# Patient Record
Sex: MALE | Employment: FULL TIME | URBAN - METROPOLITAN AREA
[De-identification: names, ages, dates, MRNs, and addresses within clinical notes are randomized per-mention and may not be internally consistent; named-entity substitution may affect disease eponyms.]

---

## 2022-05-27 DIAGNOSIS — M25.562 LEFT KNEE PAIN, UNSPECIFIED CHRONICITY: Primary | ICD-10-CM

## 2022-07-12 ENCOUNTER — OFFICE VISIT (OUTPATIENT)
Dept: ORTHOPEDIC SURGERY | Age: 52
End: 2022-07-12

## 2022-07-12 VITALS — BODY MASS INDEX: 24.13 KG/M2 | HEIGHT: 74 IN | WEIGHT: 188 LBS

## 2022-07-12 DIAGNOSIS — S83.92XA SPRAIN OF LEFT KNEE, UNSPECIFIED LIGAMENT, INITIAL ENCOUNTER: Primary | ICD-10-CM

## 2022-07-12 DIAGNOSIS — M17.0 PRIMARY OSTEOARTHRITIS OF BOTH KNEES: ICD-10-CM

## 2022-07-12 RX ORDER — GABAPENTIN 100 MG/1
100 CAPSULE ORAL 3 TIMES DAILY
Qty: 90 CAPSULE | Refills: 0 | Status: SHIPPED | OUTPATIENT
Start: 2022-07-12 | End: 2022-08-11

## 2022-07-12 RX ORDER — DICLOFENAC SODIUM 75 MG/1
75 TABLET, DELAYED RELEASE ORAL 2 TIMES DAILY
Qty: 60 TABLET | Refills: 0 | Status: SHIPPED | OUTPATIENT
Start: 2022-07-12 | End: 2022-08-11

## 2022-07-12 NOTE — PROGRESS NOTES
Name: Sofia Hendrickson  YOB: 1970  Gender: male  MRN: [id number]      What: Left knee pain and swelling  How: A workplace twisting injury at [address]  When: 8/20/2021    This is a second opinion at the request of the Workmen's Comp. carrier    HPI: Sofia Hendrickson is a 47 y.o. male seen for a second opinion at the request of his Workmen's Comp. carrier. He has a history of nicotine addiction. No previous knee problems. No right knee problems. No prior left knee problems. Prior to his injury in August 2021 he played basketball he was on the job 8/20/2021 when he twisted his left knee. He was initially seen at occupational health. He was given anti-inflammatories, a knee brace, and put in physical therapy. He did not improve. Ultimately he was seen by Dr. Dayanna Sorto. His records reflect that he had a cortisone injection in November 2021. An MRI of his left knee was obtained. Ultimately because of persistent left knee pain he was taken by Dr. Dayanna Sorto to the operating room on 2/21/2022 underwent an arthroscopy of the left knee partial medial meniscectomy chondroplasty of the medial femoral condyle and patella and synovectomy. Postop course has been complicated by left knee pain and swelling. He has been in therapy. He did have viscosupplementation. He is here today for an evaluation. He is a smoker. The right knee does not bother him. The Left knee affects his quality of life. He is not working. ROS/Meds/PSH/PMH/FH/SH: A ten system review of systems was performed and is negative other than what is in the HPI. Tobacco:  reports that he has been smoking cigarettes. He has a 10.00 pack-year smoking history.  He has never used smokeless tobacco.  Ht 6' 2\" (1.88 m)   Wt 188 lb (85.3 kg)   BMI 24.14 kg/m²      Physical Examination:  He is an awake alert pleasant gentleman ambulate with an antalgic gait on the left side    The right knee has a range of motion of 0 to 135 degrees  negative Lachman,  negative anterior drawer,   negative posterior drawer  negative pivot. Good tibial step-off,   No varus or valgus laxity at 0 or 30 degrees. Negative lateral joint line tenderness   negative lateral Amanda. Negative medial joint line tenderness  negative medial Amadna. No evidence of any posterolateral instability. No patellofemoral pain. Negative compression,   negative apprehension  no effusion. Calves Are non-tender,  neurovascularly patient is intact. Negative Homans. MPFL is non-tender. Patient Can fully extend the knee. Good quad tone  No erythema. Negative Dial test.    The left knee has well-healed portals  The left knee has a range of motion of 0 to 125 degrees  Global left knee pain particularly in the patellofemoral compartment. negative Lachman,  negative anterior drawer,   negative posterior drawer  negative pivot. Good tibial step-off,   No varus or valgus laxity at 0 or 30 degrees. Negative lateral joint line tenderness   negative lateral Amanda. Positive medial joint line tenderness  negative medial Amanda. No evidence of any posterolateral instability. No patellofemoral pain. Negative compression,   negative apprehension  2+ effusion. Calves Are non-tender,  neurovascularly patient is intact. Negative Homans. MPFL is non-tender. Patient Can fully extend the knee. Good quad tone  No erythema. Negative Dial test.  Quad weakness on the left which is not present on the right          Data Reviewed:          XR: AP standing PA 45 degree weight bearing lateral and sunrise views both knees   Clinical Indication    ICD-10-CM    1. Sprain of left knee, unspecified ligament, initial encounter  S83. 92XA XR Knee Bilateral Standard     Amb External Referral To Physical Therapy     Glucose, Body Fluid     Body fluid cell count     Crystals, Body Fluid     Culture, Anaerobic     Culture, Body Fluid     Culture, Body Fluid     Protein, Body Fluid 2. Primary osteoarthritis of both knees  M17.0       Report: AP standing PA 45 degree weightbearing lateral and sunrise views both knees demonstrate tricompartmental degenerative changes in both knees right greater than left still with preserved joint space. No fracture. No dislocation    Impression: Osteoarthritis right greater than left knee   Hebert Narvaez MD              Minor Procedure:    OFFICE PROCEDURE PROGRESS NOTE    Chart reviewed for the following:   Gold Em MD, have reviewed the History, Physical and updated the Allergic reactions for Kelsea Ayesha [place] [address] performed immediately prior to start of procedure:   Gold Em MD, have performed the following reviews on Lauren Bhat prior to the start of the procedure:            * Patient was identified by name and date of birth   * Agreement on procedure being performed was verified  * Risks and Benefits explained to the patient  * Procedure site verified and marked as necessary  * Patient was positioned for comfort     Time: 5:09 PM  Date of procedure: 7/12/2022  Procedure performed by:  Hebert Narvaez MD    After sterile prep and drape of the left knee, I aspirated 60 cc of joint fluid the patient received a 9:1 injection into the left knee. It consisted of 4.5 mL of 2% Xylocaine, 4.5 mL of 0.5% bupivacaine and 80 mg of Depo-Medrol. A sterile dressing was applied. The patient tolerated the procedure well. The left knee fluid will be sent for evaluation      Impression:   1. Sprain of left knee, unspecified ligament, initial encounter    2. Primary osteoarthritis of both knees       · Quad atrophy left lower extremity  · Status post arthroscopy of the left knee partial medial meniscectomy chondroplasty of the medial femoral condyle and patella February 2022 by Dr. Matt Espinoza  · Nicotine addiction  · Status post viscosupplementation to the left knee      Plan:   I discussed the problem with the patient.   I discussed nonoperative versus operative intervention including injections. Specifically I discussed the need for a potential osteochondral allograft transfer to the left knee. We will defer that for now. I aspirated his left knee today. We will send the fluid to be evaluated. I will give him prescriptions for Voltaren, Voltaren gel and gabapentin. We will start him back in supervised physical therapy. I emphasized the importance of smoking cessation. Essentially he has osteoarthritis in the left knee. He is young and his arthritic changes are not advanced at this point that he requires a left total knee arthroplasty. I would like to defer any further surgery if possible. If he does not improve he may require an OATS procedure to the left knee. He needs to stop smoking for at least 4 to 6 weeks to optimize the outcome from this procedure. He also needs to work on his quadriceps strength. He only had a cortisone injection once preop according to the records. Again hopefully we can treat this nonsurgically. I discussed the complexity of his problem. We will defer any surgery for now. I will recheck him back in 2 weeks. I will provide him an out of work note as well. 4 This is a chronic illness/condition with exacerbation and progression    Follow up: Return in about 2 weeks (around 7/26/2022).      Copy this note to Carmen Bishop and please make a note we had a nurse  with her        Ronn Escalera MD

## 2022-07-12 NOTE — LETTER
2022     Name: Jorge Monte  : 1970     Patient is unable to return to work until next appointment. Work Restrictions include:         Duration: 2 weeks    Lucy Andrew MD        Electronically Signed

## 2022-07-13 DIAGNOSIS — S83.92XA SPRAIN OF LEFT KNEE, UNSPECIFIED LIGAMENT, INITIAL ENCOUNTER: ICD-10-CM

## 2022-07-13 LAB
APPEARANCE FLD: NORMAL
COLOR FLD: NORMAL
NUC CELL # FLD: <100 /CU MM
RBC # FLD: 1000 /CU MM
SPECIMEN SOURCE FLD: NORMAL

## 2022-07-14 LAB
BODY FLD TYPE: NORMAL
CRYSTALS FLD MICRO: NORMAL

## 2022-07-15 LAB
BACTERIA SPEC CULT: NORMAL
GRAM STN SPEC: NORMAL
GRAM STN SPEC: NORMAL
SERVICE CMNT-IMP: NORMAL

## 2022-07-20 LAB
BACTERIA SPEC CULT: NORMAL
SERVICE CMNT-IMP: NORMAL

## 2022-07-27 ENCOUNTER — OFFICE VISIT (OUTPATIENT)
Dept: ORTHOPEDIC SURGERY | Age: 52
End: 2022-07-27

## 2022-07-27 DIAGNOSIS — M17.0 PRIMARY OSTEOARTHRITIS OF BOTH KNEES: ICD-10-CM

## 2022-07-27 DIAGNOSIS — S83.92XA SPRAIN OF LEFT KNEE, UNSPECIFIED LIGAMENT, INITIAL ENCOUNTER: Primary | ICD-10-CM

## 2022-07-27 NOTE — LETTER
2022     Name: Helen Fontenot  : 1970     Patient is unable to return to work until next appointment. Work Restrictions include:         Duration: 6 weeks      Chastity Mathews MD        Electronically Signed

## 2022-07-27 NOTE — LETTER
Workman's Compensation Authorization    To: Yuliana Farmer     From: Bolivar Michaels  Fax: [phone]     Fax: [phone]  Phone:        Phone: [phone]    Comments: Dr. Pasha Cervantes is requesting authorization for the following. The notes will be faxed the following business day. If a contracted facility is required for [facility] or Physical Therapy, please inform our office of the contracted facility.      Name : Eliu Louise    Procedure Code(s): Quad Stimulator    Diagnosis Code:     Place of Service:     Date of Service: TBD      For Surgery [email or web address] at [employer], [facility]   [address] [facility], [address] [place] [address],[place] [address]  Telephone - [phone]  Fax - [phone]

## 2022-07-27 NOTE — PROGRESS NOTES
Name: Gage Farley  YOB: 1970  Gender: male  MRN: [id number]          HPI: Gage Farley is a 47 y.o. male seen for left knee problems. He is still smoking. No change. ROS/Meds/PSH/PMH/FH/SH: A ten system review of systems was performed and is negative other than what is in the HPI. Tobacco:  reports that he has been smoking cigarettes. He has a 10.00 pack-year smoking history. He has never used smokeless tobacco.  There were no vitals taken for this visit. Physical Examination:  He is an awake alert pleasant gentleman ambulate with an antalgic gait on the left side    The right knee has a range of motion of 0 to 135 degrees  negative Lachman,  negative anterior drawer,   negative posterior drawer  negative pivot. Good tibial step-off,   No varus or valgus laxity at 0 or 30 degrees. Negative lateral joint line tenderness   negative lateral Amanda. Negative medial joint line tenderness  negative medial Amanda. No evidence of any posterolateral instability. No patellofemoral pain. Negative compression,   negative apprehension  no effusion. Calves Are non-tender,  neurovascularly patient is intact. Negative Homans. MPFL is non-tender. Patient Can fully extend the knee. Good quad tone  No erythema. Negative Dial test.    The left knee has well-healed portals  The left knee has a range of motion of 0 to 125 degrees  Global left knee pain particularly in the patellofemoral compartment. negative Lachman,  negative anterior drawer,   negative posterior drawer  negative pivot. Good tibial step-off,   No varus or valgus laxity at 0 or 30 degrees. Negative lateral joint line tenderness   negative lateral Amanda. Positive medial joint line tenderness  negative medial Amanda. No evidence of any posterolateral instability. No patellofemoral pain. Negative compression,   Negative apprehension  No effusion.    Calves Are non-tender,  neurovascularly patient is intact. Negative Homans. MPFL is non-tender. Patient Can fully extend the knee. Good quad tone  No erythema. Negative Dial test.  Quad weakness on the left which is not present on the right          Data Reviewed:      Aspirate of the left knee demonstrates no growth on culture  No crystals       Minor Procedure:        Impression:   1. Sprain of left knee, unspecified ligament, initial encounter    2. Primary osteoarthritis of both knees       Quad atrophy left lower extremity  Status post arthroscopy of the left knee partial medial meniscectomy chondroplasty of the medial femoral condyle and patella February 2022 by Dr. Nai Govea  Nicotine addiction  Status post viscosupplementation to the left knee      Plan:   I discussed the problem with the patient. I discussed nonoperative versus operative intervention including injections. Specifically I discussed the need for a potential osteochondral allograft transfer to the left knee. We will defer that for now. He is scheduled to start therapy on August 1, 2022. We will get him a quad stimulator. We will provide him an out of work note for 6 weeks. He will work on range of motion and strengthening in physical therapy. I stressed to him the importance of smoking cessation. He will stop smoking. We will get him a quad stimulator. I will provide him an out of work note for 6 weeks. I will recheck him back in 6 weeks. 4 This is a chronic illness/condition with exacerbation and progression    Follow up: Return in about 6 weeks (around 9/7/2022).      Copy this note to Zoey Cobos and please make a note we had a nurse  with her        Dixie Putnam MD

## 2022-09-07 ENCOUNTER — OFFICE VISIT (OUTPATIENT)
Dept: ORTHOPEDIC SURGERY | Age: 52
End: 2022-09-07

## 2022-09-07 DIAGNOSIS — M17.0 PRIMARY OSTEOARTHRITIS OF BOTH KNEES: ICD-10-CM

## 2022-09-07 DIAGNOSIS — S83.92XA SPRAIN OF LEFT KNEE, UNSPECIFIED LIGAMENT, INITIAL ENCOUNTER: Primary | ICD-10-CM

## 2022-09-07 NOTE — LETTER
2022     Name: Анна Izquierdo  : 1970     Patient is unable to return to work until next appointment. Work Restrictions include:         Duration: 6 weeks      Lucrecia Sahni.  Lucy Badillo MD        Electronically Signed
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
## 2022-10-25 ENCOUNTER — OFFICE VISIT (OUTPATIENT)
Dept: ORTHOPEDIC SURGERY | Age: 52
End: 2022-10-25

## 2022-10-25 DIAGNOSIS — S83.92XA SPRAIN OF LEFT KNEE, UNSPECIFIED LIGAMENT, INITIAL ENCOUNTER: Primary | ICD-10-CM

## 2022-10-25 DIAGNOSIS — M17.0 PRIMARY OSTEOARTHRITIS OF BOTH KNEES: ICD-10-CM
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
## 2023-07-24 ENCOUNTER — TELEPHONE (OUTPATIENT)
Dept: ORTHOPEDIC SURGERY | Age: 53
End: 2023-07-24

## 2023-07-24 NOTE — TELEPHONE ENCOUNTER
They mailed a questionnaire in May to the [facility]. She is going to fax another copy and is asking if they can get it back as soon as possible.

## 2023-08-01 ENCOUNTER — TELEPHONE (OUTPATIENT)
Dept: ORTHOPEDIC SURGERY | Age: 53
End: 2023-08-01

## 2023-08-01 NOTE — TELEPHONE ENCOUNTER
Austyn kingsley/ Jacky Alonso  with Larissa Ramirez in [facility] about a 3pg questionnaire sent 5-12 and faxed 7-24 wanting to know a status [phone].

## 2023-08-07 ENCOUNTER — TELEPHONE (OUTPATIENT)
Dept: ORTHOPEDIC SURGERY | Age: 53
End: 2023-08-07

## 2023-09-08 ENCOUNTER — TELEPHONE (OUTPATIENT)
Dept: ORTHOPEDIC SURGERY | Age: 53
End: 2023-09-08

## 2023-09-08 NOTE — TELEPHONE ENCOUNTER
Spoke with [facility] who informed [facility] that patient is incarcerated. Per [facility], we will see pt once he is released, but not while he is incarcerated. I called and spoke to Rosalia Jones at [facility] 's office and notified them that appt for 9/13 was cancelled and that we can reschedule when pt is released. She will notify his  and call our office to reschedule.

## 2023-09-08 NOTE — TELEPHONE ENCOUNTER
Cristina Barger, this pt is in senior living and he told the nurse  That he would be having surgery soon with  Dr Magen Carty,  it this is true will you call the   Nurse and tell what will be happening  Her name is Nicki Tad [phone]

## 2023-09-11 ENCOUNTER — CLINICAL DOCUMENTATION (OUTPATIENT)
Dept: ORTHOPEDIC SURGERY | Age: 53
End: 2023-09-11

## 2023-09-11 NOTE — PROGRESS NOTES
23      Trung Nice  : 1970      To whom it may concern:    Trung Nice is a patient who was last seen in my office 2022 for a workplace left knee problem. I have not seen the patient since 2022. In the interim he has been incarcerated. It is unclear the reason for his incarceration or the length of his incarceration. The nurse from the [facility] called our office on 2023 because she was told by Mr. Flower Sepulveda that he was having upcoming left knee surgery. Mr. Flower Sepulveda is not scheduled to have left knee surgery. He will not be evaluated or receive any form of left knee treatment from my office while he is incarcerated. His current left knee problem is nonemergent. If he has an emergent left knee problem he can be treated by the [facility] medical providers. Please feel free to contact my office if you have any further questions.     Sincerely    Isabel Ku MD

## 2023-10-24 ENCOUNTER — OFFICE VISIT (OUTPATIENT)
Dept: ORTHOPEDIC SURGERY | Age: 53
End: 2023-10-24

## 2023-10-24 ENCOUNTER — NURSE ONLY (OUTPATIENT)
Dept: INTERNAL MEDICINE CLINIC | Facility: CLINIC | Age: 53
End: 2023-10-24

## 2023-10-24 DIAGNOSIS — S83.92XA SPRAIN OF LEFT KNEE, UNSPECIFIED LIGAMENT, INITIAL ENCOUNTER: ICD-10-CM

## 2023-10-24 DIAGNOSIS — M17.0 PRIMARY OSTEOARTHRITIS OF BOTH KNEES: ICD-10-CM

## 2023-10-24 DIAGNOSIS — S83.92XA SPRAIN OF LEFT KNEE, UNSPECIFIED LIGAMENT, INITIAL ENCOUNTER: Primary | ICD-10-CM
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
## 2023-10-25 LAB
AMPHET UR QL SCN: NEGATIVE
BARBITURATES UR QL SCN: NEGATIVE
BENZODIAZ UR QL: NEGATIVE
CANNABINOIDS UR QL SCN: NEGATIVE
COCAINE UR QL SCN: NEGATIVE
METHADONE UR QL: NEGATIVE
OPIATES UR QL: NEGATIVE
PCP UR QL: NEGATIVE

## 2023-11-01 LAB
COTININE SERPL-MCNC: 305.8 NG/ML
NICOTINE SERPL-MCNC: 7 NG/ML

## 2023-12-29 ENCOUNTER — TELEPHONE (OUTPATIENT)
Dept: ORTHOPEDIC SURGERY | Age: 53
End: 2023-12-29

## 2023-12-29 NOTE — TELEPHONE ENCOUNTER
Please  call lourdes otoole  w/  clarisse  she  needs to  r/s  this pts  1/16 appt.     And also  perhaps  he  can  be  seen  sooner       Peterson Wright

## 2023-12-29 NOTE — TELEPHONE ENCOUNTER
Called and spoke to Fermin Avila who states that patient will be out of town and needs to reschedule appt.  Appt rescheduled and office notes and labs faxed to Fermin Avila at her request.

## 2024-01-25 DIAGNOSIS — S83.92XA SPRAIN OF LEFT KNEE, UNSPECIFIED LIGAMENT, INITIAL ENCOUNTER: ICD-10-CM

## 2024-01-29 ENCOUNTER — OFFICE VISIT (OUTPATIENT)
Dept: ORTHOPEDIC SURGERY | Age: 54
End: 2024-01-29
Payer: COMMERCIAL

## 2024-01-29 DIAGNOSIS — S83.92XA SPRAIN OF LEFT KNEE, UNSPECIFIED LIGAMENT, INITIAL ENCOUNTER: Primary | ICD-10-CM

## 2024-01-29 DIAGNOSIS — M17.0 PRIMARY OSTEOARTHRITIS OF BOTH KNEES: ICD-10-CM

## 2024-01-29 PROCEDURE — 99214 OFFICE O/P EST MOD 30 MIN: CPT | Performed by: ORTHOPAEDIC SURGERY

## 2024-01-29 PROCEDURE — 99366 TEAM CONF W/PAT BY HC PROF: CPT | Performed by: ORTHOPAEDIC SURGERY
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
## 2024-02-07 ENCOUNTER — TELEPHONE (OUTPATIENT)
Dept: ORTHOPEDIC SURGERY | Age: 54
End: 2024-02-07

## 2024-02-07 NOTE — TELEPHONE ENCOUNTER
Madhuri is calling to set up an in person conference at the office regarding this patient with  Rob Knapp. Please call Madhuri to schedule.

## 2024-09-11 ENCOUNTER — OFFICE VISIT (OUTPATIENT)
Dept: ORTHOPEDIC SURGERY | Age: 54
End: 2024-09-11
Payer: COMMERCIAL

## 2024-09-11 DIAGNOSIS — M17.0 PRIMARY OSTEOARTHRITIS OF BOTH KNEES: ICD-10-CM

## 2024-09-11 DIAGNOSIS — S83.92XA SPRAIN OF LEFT KNEE, UNSPECIFIED LIGAMENT, INITIAL ENCOUNTER: Primary | ICD-10-CM

## 2024-09-11 PROCEDURE — 99214 OFFICE O/P EST MOD 30 MIN: CPT | Performed by: ORTHOPAEDIC SURGERY

## 2024-09-11 PROCEDURE — 99366 TEAM CONF W/PAT BY HC PROF: CPT | Performed by: ORTHOPAEDIC SURGERY

## 2024-10-16 ENCOUNTER — OFFICE VISIT (OUTPATIENT)
Dept: ORTHOPEDIC SURGERY | Age: 54
End: 2024-10-16
Payer: COMMERCIAL

## 2024-10-16 DIAGNOSIS — M17.0 PRIMARY OSTEOARTHRITIS OF BOTH KNEES: ICD-10-CM

## 2024-10-16 DIAGNOSIS — S83.92XA SPRAIN OF LEFT KNEE, UNSPECIFIED LIGAMENT, INITIAL ENCOUNTER: Primary | ICD-10-CM

## 2024-10-16 PROCEDURE — 99213 OFFICE O/P EST LOW 20 MIN: CPT | Performed by: ORTHOPAEDIC SURGERY

## 2024-10-16 PROCEDURE — 99366 TEAM CONF W/PAT BY HC PROF: CPT | Performed by: ORTHOPAEDIC SURGERY
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
## 2024-10-16 NOTE — PROGRESS NOTES
10/16/2024      Cristian Edmondson  [id number]  1970      DISABILITY RATING    Mr. Edmondson has reached maximum medical improvement.  The permanent partial impairment of his left knee related to his workplace injury is a 5% permanent partial impairment according to the AMA Guides to the Evaluation of Permanent Impairment, Sixth Edition.  This corresponds to a 2% whole body impairment.   He can return to work light duty.    His permanent work restrictions include no prolonged standing, no kneeling, no bed, no squatting, no crawling, and no ladder climbing.    This rating and release is the total impairment of his left knee and is not in addition to any other previous ratings.    I hold all these opinions within a reasonable degree of medical certainty.    Please feel free to contact my office for any further questions.      Oral Powers Jr., MD

## 2024-10-28 ENCOUNTER — TELEPHONE (OUTPATIENT)
Dept: ORTHOPEDIC SURGERY | Age: 54
End: 2024-10-28

## 2024-10-28 NOTE — TELEPHONE ENCOUNTER
Kiki with the  is calling about the 14b. She wants to make sure you received payment and to see if it's completed.

## 2024-10-31 ENCOUNTER — TELEPHONE (OUTPATIENT)
Dept: ORTHOPEDIC SURGERY | Age: 54
End: 2024-10-31

## 2024-11-06 ENCOUNTER — TELEPHONE (OUTPATIENT)
Dept: ORTHOPEDIC SURGERY | Age: 54
End: 2024-11-06

## 2024-11-06 NOTE — TELEPHONE ENCOUNTER
Madhuri  with  [employer]  has  called  and  she  has been  emailing  with  [employer]  to  set  up  a deposition and  has  not  received an  answer  please  call her as soon as you are  able to   [phone]

## 2024-11-06 NOTE — TELEPHONE ENCOUNTER
14B has been sent.  
[facility] patient. A 14b was submitted to [facility] for completion she is checking on the status. Stated payment was sent on 10/21.  
normal...

## 2024-11-15 ENCOUNTER — TELEPHONE (OUTPATIENT)
Dept: ORTHOPEDIC SURGERY | Age: 54
End: 2024-11-15

## 2024-11-18 ENCOUNTER — TELEPHONE (OUTPATIENT)
Age: 54
End: 2024-11-18

## 2024-11-21 ENCOUNTER — TELEPHONE (OUTPATIENT)
Dept: ORTHOPEDIC SURGERY | Age: 54
End: 2024-11-21

## 2024-11-22 ENCOUNTER — TELEPHONE (OUTPATIENT)
Dept: ORTHOPEDIC SURGERY | Age: 54
End: 2024-11-22

## 2024-11-22 NOTE — TELEPHONE ENCOUNTER
Spoke with Madhuri who confirmed 1/29/25 for new deposition date. Informed her that [employer] would send new invoice with date change. Madhuri wanted to confirm the cancellation policy, will confirm with [employer] and let her know.

## 2024-11-22 NOTE — TELEPHONE ENCOUNTER
Called and spoke to Madhuri at [facility] and offered 1/29 @ 2p for a deposition. She was going to confirm with the other  and call back to confirm new deposition date.

## 2024-12-02 ENCOUNTER — TELEPHONE (OUTPATIENT)
Dept: ORTHOPEDIC SURGERY | Age: 54
End: 2024-12-02

## 2024-12-03 NOTE — TELEPHONE ENCOUNTER
Called and was transferred to [place] and then [facility] confirming 1-29-25 at 2pm for depo.   
Please call fernie with law firm.she rec the invoice.she said no dates were on it.[phone]  
[phone]

## 2025-01-02 ENCOUNTER — TELEPHONE (OUTPATIENT)
Dept: ORTHOPEDIC SURGERY | Age: 55
End: 2025-01-02

## 2025-01-02 NOTE — TELEPHONE ENCOUNTER
Madhuri is calling to see where to send the payment for the upcoming deposition or if you want them to just hand it to Dr. Powers when they see him for the depo.